# Patient Record
Sex: MALE | Race: WHITE | NOT HISPANIC OR LATINO | URBAN - METROPOLITAN AREA
[De-identification: names, ages, dates, MRNs, and addresses within clinical notes are randomized per-mention and may not be internally consistent; named-entity substitution may affect disease eponyms.]

---

## 2024-01-20 ENCOUNTER — APPOINTMENT (OUTPATIENT)
Dept: URGENT CARE | Age: 54
End: 2024-01-20

## 2024-09-09 ENCOUNTER — APPOINTMENT (OUTPATIENT)
Dept: URBAN - METROPOLITAN AREA CLINIC 315 | Age: 54
Setting detail: DERMATOLOGY
End: 2024-09-09

## 2024-09-09 DIAGNOSIS — Z12.83 ENCOUNTER FOR SCREENING FOR MALIGNANT NEOPLASM OF SKIN: ICD-10-CM

## 2024-09-09 DIAGNOSIS — D18.0 HEMANGIOMA: ICD-10-CM

## 2024-09-09 DIAGNOSIS — L738 OTHER SPECIFIED DISEASES OF HAIR AND HAIR FOLLICLES: ICD-10-CM

## 2024-09-09 DIAGNOSIS — L40.0 PSORIASIS VULGARIS: ICD-10-CM

## 2024-09-09 DIAGNOSIS — L81.4 OTHER MELANIN HYPERPIGMENTATION: ICD-10-CM

## 2024-09-09 DIAGNOSIS — L663 OTHER SPECIFIED DISEASES OF HAIR AND HAIR FOLLICLES: ICD-10-CM

## 2024-09-09 DIAGNOSIS — D22 MELANOCYTIC NEVI: ICD-10-CM

## 2024-09-09 PROBLEM — L02.821 FURUNCLE OF HEAD [ANY PART, EXCEPT FACE]: Status: ACTIVE | Noted: 2024-09-09

## 2024-09-09 PROBLEM — D22.5 MELANOCYTIC NEVI OF TRUNK: Status: ACTIVE | Noted: 2024-09-09

## 2024-09-09 PROBLEM — D18.01 HEMANGIOMA OF SKIN AND SUBCUTANEOUS TISSUE: Status: ACTIVE | Noted: 2024-09-09

## 2024-09-09 PROCEDURE — OTHER RECORDS REVIEWED: OTHER

## 2024-09-09 PROCEDURE — OTHER PRESCRIPTION: OTHER

## 2024-09-09 PROCEDURE — OTHER MIPS QUALITY: OTHER

## 2024-09-09 PROCEDURE — OTHER PRESCRIPTION MEDICATION MANAGEMENT: OTHER

## 2024-09-09 PROCEDURE — 99214 OFFICE O/P EST MOD 30 MIN: CPT

## 2024-09-09 PROCEDURE — OTHER COUNSELING: OTHER

## 2024-09-09 PROCEDURE — OTHER REASSURANCE: OTHER

## 2024-09-09 RX ORDER — CLINDAMYCIN PHOSPHATE 10 MG/ML
LOTION TOPICAL
Qty: 60 | Refills: 5 | Status: ERX | COMMUNITY
Start: 2024-09-09

## 2024-09-09 RX ORDER — BENZOYL PEROXIDE 10 G/100G
SUSPENSION TOPICAL
Qty: 142 | Refills: 0 | Status: ERX | COMMUNITY
Start: 2024-09-09

## 2024-09-09 ASSESSMENT — LOCATION ZONE DERM
LOCATION ZONE: SCALP
LOCATION ZONE: LEG
LOCATION ZONE: TRUNK
LOCATION ZONE: NECK

## 2024-09-09 ASSESSMENT — LOCATION SIMPLE DESCRIPTION DERM
LOCATION SIMPLE: RIGHT PRETIBIAL REGION
LOCATION SIMPLE: UPPER BACK
LOCATION SIMPLE: POSTERIOR NECK
LOCATION SIMPLE: RIGHT THIGH
LOCATION SIMPLE: POSTERIOR SCALP

## 2024-09-09 ASSESSMENT — LOCATION DETAILED DESCRIPTION DERM
LOCATION DETAILED: RIGHT ANTERIOR DISTAL THIGH
LOCATION DETAILED: SUPERIOR THORACIC SPINE
LOCATION DETAILED: MID-OCCIPITAL SCALP
LOCATION DETAILED: RIGHT LATERAL TRAPEZIAL NECK
LOCATION DETAILED: LEFT LATERAL TRAPEZIAL NECK
LOCATION DETAILED: RIGHT PROXIMAL PRETIBIAL REGION

## 2024-09-09 NOTE — PROCEDURE: PRESCRIPTION MEDICATION MANAGEMENT
Initiate Treatment: clindamycin 1 % lotion \\nApply once daily in morning as needed to affected area\\n\\nbenzoyl peroxide 10 % topical cleanser \\nApply to scalp daily
Render In Strict Bullet Format?: No
Detail Level: Zone

## 2024-09-09 NOTE — HPI: PREVENTATIVE SKIN CHECK
What Is The Reason For Today's Visit?: Full Body Skin Examination
Additional History: Last visit was in 2021.

## 2024-09-09 NOTE — PROCEDURE: MIPS QUALITY
Quality 226: Preventive Care And Screening: Tobacco Use: Screening And Cessation Intervention: Patient screened for tobacco use and is an ex/non-smoker
Quality 47: Advance Care Plan: Advance care planning not documented, reason not otherwise specified.
Detail Level: Detailed
Quality 485: Psoriasis - Improvement In Patient-Reported Itch Severity: Itch severity assessment score is reduced by 3 or more points from the initial (index) assessment score to the follow-up visit score
Quality 431: Preventive Care And Screening: Unhealthy Alcohol Use - Screening: Patient not identified as an unhealthy alcohol user when screened for unhealthy alcohol use using a systematic screening method
Quality 137: Melanoma: Continuity Of Care - Recall System: Recall system not utilized, reason not otherwise specified

## 2024-09-09 NOTE — PROCEDURE: COUNSELING
Sunscreen Recommendations: I recommended a broad spectrum sunscreen with a SPF of 30 or higher. I explained that SPF 30 sunscreens block approximately 97 percent of the sun's harmful rays. Sunscreens should be applied at least 15 minutes prior to expected sun exposure and then every 2 hours after that as long as sun exposure continues. If swimming or exercising sunscreen should be reapplied every 45 minutes to an hour after getting wet or sweating. One ounce, or the equivalent of a shot glass full of sunscreen, is adequate to protect the skin not covered by a bathing suit. I also recommended a lip balm with a sunscreen as well. Sun protective clothing can be used in lieu of sunscreen but must be worn the entire time you are exposed to the sun's rays.
Detail Level: Detailed
Patient Specific Counseling (Will Not Stick From Patient To Patient): pt can call for refills of topical steroids as needed
Patient Specific Counseling (Will Not Stick From Patient To Patient): d/w pt this can be a S/E of Rinvoq